# Patient Record
Sex: FEMALE | Race: WHITE | NOT HISPANIC OR LATINO | URBAN - METROPOLITAN AREA
[De-identification: names, ages, dates, MRNs, and addresses within clinical notes are randomized per-mention and may not be internally consistent; named-entity substitution may affect disease eponyms.]

---

## 2017-01-31 ENCOUNTER — APPOINTMENT (RX ONLY)
Dept: URBAN - METROPOLITAN AREA CLINIC 13 | Facility: CLINIC | Age: 27
Setting detail: DERMATOLOGY
End: 2017-01-31

## 2017-01-31 VITALS
DIASTOLIC BLOOD PRESSURE: 62 MMHG | SYSTOLIC BLOOD PRESSURE: 116 MMHG | WEIGHT: 200 LBS | HEIGHT: 67 IN | HEART RATE: 79 BPM

## 2017-01-31 DIAGNOSIS — L70.0 ACNE VULGARIS: ICD-10-CM

## 2017-01-31 PROCEDURE — ? PRESCRIPTION

## 2017-01-31 PROCEDURE — 99213 OFFICE O/P EST LOW 20 MIN: CPT

## 2017-01-31 PROCEDURE — ? ISOTRETINOIN MONITORING

## 2017-01-31 RX ORDER — ISOTRETINOIN 40 MG/1
CAPSULE ORAL
Qty: 60 | Refills: 0 | Status: ERX

## 2017-01-31 NOTE — PROCEDURE: ISOTRETINOIN MONITORING
Detail Level: Zone
Dosing Month 1 (Required For Cumulative Dosing): 40mg BID
Kilograms Preamble Statement (Weight Entered In Details Tab): Reported Weight in pounds:
Patient Weight (Optional But Required For Cumulative Dose-Numbers And Decimals Only): 200
Months Of Therapy Completed: 5
Display Individual Monthly Dosage In The Note (If Yes Will Display All Dosages Which Are Not N/A): yes
Weight Units: pounds

## 2017-03-02 ENCOUNTER — APPOINTMENT (RX ONLY)
Dept: URBAN - METROPOLITAN AREA CLINIC 13 | Facility: CLINIC | Age: 27
Setting detail: DERMATOLOGY
End: 2017-03-02

## 2017-03-02 VITALS — HEIGHT: 67 IN | WEIGHT: 200 LBS

## 2017-03-02 DIAGNOSIS — L70.0 ACNE VULGARIS: ICD-10-CM

## 2017-03-02 PROCEDURE — ? ORDER TESTS

## 2017-03-02 PROCEDURE — ? OBSERVATION

## 2017-03-02 PROCEDURE — ? COUNSELING

## 2017-03-02 PROCEDURE — ? ISOTRETINOIN MONITORING

## 2017-03-02 PROCEDURE — ? PRESCRIPTION

## 2017-03-02 PROCEDURE — 99212 OFFICE O/P EST SF 10 MIN: CPT

## 2017-03-02 PROCEDURE — ? DEFER

## 2017-03-02 RX ORDER — ISOTRETINOIN 40 MG/1
CAPSULE ORAL
Qty: 60 | Refills: 0 | Status: ERX

## 2017-03-02 ASSESSMENT — LOCATION DETAILED DESCRIPTION DERM
LOCATION DETAILED: LEFT SUPERIOR CENTRAL BUCCAL CHEEK
LOCATION DETAILED: RIGHT CENTRAL MALAR CHEEK

## 2017-03-02 ASSESSMENT — LOCATION SIMPLE DESCRIPTION DERM
LOCATION SIMPLE: RIGHT CHEEK
LOCATION SIMPLE: LEFT CHEEK

## 2017-03-02 ASSESSMENT — LOCATION ZONE DERM: LOCATION ZONE: FACE

## 2017-03-02 NOTE — PROCEDURE: ISOTRETINOIN MONITORING
Dosing Month 4 (Required For Cumulative Dosing): 40mg BID
Display Individual Monthly Dosage In The Note (If Yes Will Display All Dosages Which Are Not N/A): yes
Patient Weight (Optional But Required For Cumulative Dose-Numbers And Decimals Only): 200
Weight Units: pounds
Detail Level: Zone
Pounds Preamble Statement (Weight Entered In Details Tab): Reported Weight in pounds:
Months Of Therapy Completed: 5

## 2017-03-02 NOTE — PROCEDURE: DEFER
Detail Level: Detailed
Instructions (Optional): Patient can have cyst injected if it's inflamed ($40 with MA, or office visit with Patti)
Procedure To Be Performed At Next Visit: Intralesional Kenalog

## 2017-03-31 ENCOUNTER — APPOINTMENT (RX ONLY)
Dept: URBAN - METROPOLITAN AREA CLINIC 13 | Facility: CLINIC | Age: 27
Setting detail: DERMATOLOGY
End: 2017-03-31

## 2017-03-31 VITALS
WEIGHT: 200 LBS | SYSTOLIC BLOOD PRESSURE: 129 MMHG | HEART RATE: 59 BPM | DIASTOLIC BLOOD PRESSURE: 71 MMHG | HEIGHT: 67 IN

## 2017-03-31 DIAGNOSIS — L70.0 ACNE VULGARIS: ICD-10-CM | Status: IMPROVED

## 2017-03-31 PROCEDURE — ? OBSERVATION

## 2017-03-31 PROCEDURE — ? COUNSELING

## 2017-03-31 PROCEDURE — 99213 OFFICE O/P EST LOW 20 MIN: CPT

## 2017-03-31 PROCEDURE — ? ORDER TESTS

## 2017-03-31 PROCEDURE — ? ISOTRETINOIN MONITORING

## 2017-03-31 ASSESSMENT — LOCATION ZONE DERM: LOCATION ZONE: FACE

## 2017-03-31 ASSESSMENT — LOCATION DETAILED DESCRIPTION DERM: LOCATION DETAILED: LEFT MEDIAL FOREHEAD

## 2017-03-31 ASSESSMENT — LOCATION SIMPLE DESCRIPTION DERM: LOCATION SIMPLE: LEFT FOREHEAD

## 2017-03-31 NOTE — PROCEDURE: ISOTRETINOIN MONITORING
Pounds Preamble Statement (Weight Entered In Details Tab): Reported Weight in pounds:
Weight Units: pounds
Months Of Therapy Completed: 5
Dosing Month 5 (Required For Cumulative Dosing): 40mg BID
Display Individual Monthly Dosage In The Note (If Yes Will Display All Dosages Which Are Not N/A): yes
Patient Weight (Optional But Required For Cumulative Dose-Numbers And Decimals Only): 200
Detail Level: Zone

## 2017-04-03 ENCOUNTER — RX ONLY (OUTPATIENT)
Age: 27
Setting detail: RX ONLY
End: 2017-04-03

## 2017-04-03 RX ORDER — ISOTRETINOIN 40 MG/1
CAPSULE, LIQUID FILLED ORAL
Qty: 60 | Refills: 0 | Status: ERX | COMMUNITY
Start: 2017-04-03

## 2017-05-04 ENCOUNTER — APPOINTMENT (RX ONLY)
Dept: URBAN - METROPOLITAN AREA CLINIC 13 | Facility: CLINIC | Age: 27
Setting detail: DERMATOLOGY
End: 2017-05-04

## 2017-05-04 VITALS
SYSTOLIC BLOOD PRESSURE: 110 MMHG | HEIGHT: 67 IN | HEART RATE: 78 BPM | WEIGHT: 200 LBS | DIASTOLIC BLOOD PRESSURE: 77 MMHG

## 2017-05-04 DIAGNOSIS — L70.0 ACNE VULGARIS: ICD-10-CM | Status: IMPROVED

## 2017-05-04 DIAGNOSIS — I83.9 ASYMPTOMATIC VARICOSE VEINS OF LOWER EXTREMITIES: ICD-10-CM

## 2017-05-04 PROBLEM — I83.90 ASYMPTOMATIC VARICOSE VEINS OF UNSPECIFIED LOWER EXTREMITY: Status: ACTIVE | Noted: 2017-05-04

## 2017-05-04 PROCEDURE — ? COUNSELING

## 2017-05-04 PROCEDURE — ? TREATMENT REGIMEN

## 2017-05-04 PROCEDURE — 99213 OFFICE O/P EST LOW 20 MIN: CPT

## 2017-05-04 PROCEDURE — ? OBSERVATION

## 2017-05-04 PROCEDURE — ? PRESCRIPTION

## 2017-05-04 RX ORDER — TRETINOIN 0.5 MG/G
CREAM TOPICAL DAILY
Qty: 1 | Refills: 11 | Status: ERX | COMMUNITY
Start: 2017-05-04

## 2017-05-04 RX ADMIN — TRETINOIN: 0.5 CREAM TOPICAL at 22:04

## 2017-05-04 ASSESSMENT — LOCATION DETAILED DESCRIPTION DERM: LOCATION DETAILED: INFERIOR MID FOREHEAD

## 2017-05-04 ASSESSMENT — LOCATION SIMPLE DESCRIPTION DERM: LOCATION SIMPLE: INFERIOR FOREHEAD

## 2017-05-04 ASSESSMENT — LOCATION ZONE DERM: LOCATION ZONE: FACE

## 2017-05-04 NOTE — PROCEDURE: TREATMENT REGIMEN
Plan: [facility] given
Continue Regimen: Finish last packet of isotretinoin
Detail Level: Zone
Initiate Treatment: Tretinoin .05% in PM once finished with isotretinoin